# Patient Record
Sex: FEMALE | RURAL
[De-identification: names, ages, dates, MRNs, and addresses within clinical notes are randomized per-mention and may not be internally consistent; named-entity substitution may affect disease eponyms.]

---

## 2021-01-28 ENCOUNTER — HISTORICAL (OUTPATIENT)
Dept: ADMINISTRATIVE | Facility: HOSPITAL | Age: 56
End: 2021-01-28

## 2021-01-28 LAB
ALBUMIN SERPL BCP-MCNC: 4.1 G/DL (ref 3.5–5)
ALP SERPL-CCNC: 110 U/L (ref 46–118)
ALT SERPL W P-5'-P-CCNC: 19 U/L (ref 13–56)
AST SERPL W P-5'-P-CCNC: 12 U/L (ref 15–37)
BILIRUB DIRECT SERPL-MCNC: <0.1 MG/DL (ref 0–0.2)
BILIRUB SERPL-MCNC: 0.2 MG/DL (ref 0–1.2)
PROT SERPL-MCNC: 8.7 G/DL (ref 6.4–8.2)

## 2024-09-23 DIAGNOSIS — R91.1 PULMONARY NODULE: Primary | ICD-10-CM

## 2024-09-23 DIAGNOSIS — J44.9 COPD (CHRONIC OBSTRUCTIVE PULMONARY DISEASE): ICD-10-CM

## 2024-10-07 ENCOUNTER — OFFICE VISIT (OUTPATIENT)
Dept: PULMONOLOGY | Facility: CLINIC | Age: 59
End: 2024-10-07
Payer: COMMERCIAL

## 2024-10-07 VITALS
WEIGHT: 151 LBS | DIASTOLIC BLOOD PRESSURE: 96 MMHG | RESPIRATION RATE: 16 BRPM | BODY MASS INDEX: 25.78 KG/M2 | SYSTOLIC BLOOD PRESSURE: 138 MMHG | HEART RATE: 88 BPM | HEIGHT: 64 IN | OXYGEN SATURATION: 96 %

## 2024-10-07 DIAGNOSIS — R91.8 MULTIPLE PULMONARY NODULES: ICD-10-CM

## 2024-10-07 DIAGNOSIS — J44.9 COPD (CHRONIC OBSTRUCTIVE PULMONARY DISEASE): Primary | ICD-10-CM

## 2024-10-07 DIAGNOSIS — R06.02 SHORTNESS OF BREATH: ICD-10-CM

## 2024-10-07 DIAGNOSIS — R91.1 PULMONARY NODULE: ICD-10-CM

## 2024-10-07 DIAGNOSIS — Z72.0 TOBACCO USE: ICD-10-CM

## 2024-10-07 PROCEDURE — 3080F DIAST BP >= 90 MM HG: CPT | Mod: CPTII,,, | Performed by: INTERNAL MEDICINE

## 2024-10-07 PROCEDURE — 99205 OFFICE O/P NEW HI 60 MIN: CPT | Mod: PBBFAC | Performed by: INTERNAL MEDICINE

## 2024-10-07 PROCEDURE — 3008F BODY MASS INDEX DOCD: CPT | Mod: CPTII,,, | Performed by: INTERNAL MEDICINE

## 2024-10-07 PROCEDURE — 99204 OFFICE O/P NEW MOD 45 MIN: CPT | Mod: S$PBB,,, | Performed by: INTERNAL MEDICINE

## 2024-10-07 PROCEDURE — 3075F SYST BP GE 130 - 139MM HG: CPT | Mod: CPTII,,, | Performed by: INTERNAL MEDICINE

## 2024-10-07 PROCEDURE — 1159F MED LIST DOCD IN RCRD: CPT | Mod: CPTII,,, | Performed by: INTERNAL MEDICINE

## 2024-10-07 PROCEDURE — 99999 PR PBB SHADOW E&M-NEW PATIENT-LVL V: CPT | Mod: PBBFAC,,, | Performed by: INTERNAL MEDICINE

## 2024-10-07 RX ORDER — CALCIUM CARBONATE 200(500)MG
1 TABLET,CHEWABLE ORAL
COMMUNITY

## 2024-10-07 RX ORDER — ROSUVASTATIN CALCIUM 20 MG/1
20 TABLET, COATED ORAL NIGHTLY
COMMUNITY
Start: 2024-09-09

## 2024-10-07 RX ORDER — AMLODIPINE BESYLATE 10 MG/1
10 TABLET ORAL NIGHTLY
COMMUNITY
Start: 2024-09-02

## 2024-10-07 RX ORDER — ASPIRIN 81 MG/1
81 TABLET ORAL DAILY
COMMUNITY

## 2024-10-07 RX ORDER — CYCLOBENZAPRINE HCL 10 MG
10 TABLET ORAL 3 TIMES DAILY PRN
COMMUNITY
Start: 2024-05-01

## 2024-10-07 RX ORDER — ONDANSETRON 4 MG/1
8 TABLET, FILM COATED ORAL 2 TIMES DAILY
COMMUNITY
Start: 2024-10-02

## 2024-10-07 RX ORDER — MELOXICAM 15 MG/1
15 TABLET ORAL DAILY
COMMUNITY
Start: 2024-10-04

## 2024-10-07 RX ORDER — IPRATROPIUM BROMIDE AND ALBUTEROL 20; 100 UG/1; UG/1
1 SPRAY, METERED RESPIRATORY (INHALATION) 4 TIMES DAILY
Qty: 4 G | Refills: 5 | Status: SHIPPED | OUTPATIENT
Start: 2024-10-07

## 2024-10-07 NOTE — PROGRESS NOTES
Subjective:       Patient ID: Michelle Sharp is a 59 y.o. female.    Chief Complaint: Pulmonary Nodules (New patient referred by CORBIN Lutz for pulmonary nodules and COPD. )    Pulmonary Nodules  This is a chronic problem. The current episode started more than 1 month ago. The problem occurs daily. The problem has been unchanged. Pertinent negatives include no abdominal pain, arthralgias, chest pain, chills, congestion, headaches or rash. The symptoms are aggravated by exertion.     Past Medical History:   Diagnosis Date    DDD (degenerative disc disease), lumbar     Emphysema, unspecified     Hypertension     Lung nodule seen on imaging study     Mixed hyperlipidemia      Past Surgical History:   Procedure Laterality Date    breast tumor removal Right     fibrocystic breast    CHOLECYSTECTOMY      COLOSTOMY      placed and removed    HERNIA REPAIR      TONSILLECTOMY      TUBAL LIGATION       No family history on file.  Review of patient's allergies indicates:  No Known Allergies   Social History     Tobacco Use    Smoking status: Every Day     Current packs/day: 0.50     Average packs/day: 0.5 packs/day for 45.8 years (22.9 ttl pk-yrs)     Types: Cigarettes     Start date: 1979    Smokeless tobacco: Never   Substance Use Topics    Alcohol use: Yes     Alcohol/week: 1.0 standard drink of alcohol     Types: 1 Glasses of wine per week     Comment: Occ    Drug use: Yes     Types: Marijuana      Review of Systems   Constitutional:  Negative for chills, activity change and night sweats.   HENT:  Negative for congestion and ear pain.    Eyes:  Negative for redness and itching.   Cardiovascular:  Negative for chest pain and palpitations.   Musculoskeletal:  Negative for arthralgias and back pain.   Skin:  Negative for rash.   Gastrointestinal:  Negative for abdominal pain and abdominal distention.   Neurological:  Negative for dizziness and headaches.   Hematological:  Negative for adenopathy. Does not bruise/bleed  "easily.   Psychiatric/Behavioral:  Negative for confusion. The patient is not nervous/anxious.        Objective:      Physical Exam   Constitutional: She is oriented to person, place, and time. She appears well-developed and well-nourished.   HENT:   Head: Normocephalic.   Nose: Nose normal.   Mouth/Throat: Oropharynx is clear and moist.   Neck: No JVD present. No thyromegaly present.   Cardiovascular: Normal rate, regular rhythm, normal heart sounds and intact distal pulses.   Pulmonary/Chest: Normal expansion, hyperinflation, symmetric chest wall expansion, effort normal and breath sounds normal.   Abdominal: Soft. Bowel sounds are normal.   Musculoskeletal:         General: Normal range of motion.      Cervical back: Normal range of motion and neck supple.   Lymphadenopathy: No supraclavicular adenopathy is present.     She has no cervical adenopathy.   Neurological: She is alert and oriented to person, place, and time. She has normal reflexes.   Skin: Skin is warm and dry.   Psychiatric: She has a normal mood and affect. Her behavior is normal.     Personal Diagnostic Review  none pertinent        10/7/2024     3:59 PM   Pulmonary Function Tests   SpO2 96 %   Height 5' 4" (1.626 m)   Weight 68.5 kg (151 lb)   BMI (Calculated) 25.9         Assessment:       1. Multiple pulmonary nodules    2. Pulmonary nodule    3. COPD (chronic obstructive pulmonary disease)    4. Shortness of breath    5. Tobacco use        Outpatient Encounter Medications as of 10/7/2024   Medication Sig Dispense Refill    amLODIPine (NORVASC) 10 MG tablet Take 10 mg by mouth every evening.      aspirin (ECOTRIN) 81 MG EC tablet Take 81 mg by mouth once daily.      cyclobenzaprine (FLEXERIL) 10 MG tablet Take 10 mg by mouth 3 (three) times daily as needed.      meloxicam (MOBIC) 15 MG tablet Take 15 mg by mouth once daily.      ondansetron (ZOFRAN) 4 MG tablet Take 8 mg by mouth 2 (two) times daily.      rosuvastatin (CRESTOR) 20 MG tablet " Take 20 mg by mouth every evening.      calcium carbonate (TUMS) 200 mg calcium (500 mg) chewable tablet Take 1 tablet by mouth as needed. (Patient not taking: Reported on 10/7/2024)      ipratropium-albuteroL (COMBIVENT RESPIMAT)  mcg/actuation inhaler Inhale 1 puff into the lungs 4 (four) times daily. Rescue 4 g 5     No facility-administered encounter medications on file as of 10/7/2024.     Orders Placed This Encounter   Procedures    Complete PFT with bronchodilator     Standing Status:   Future     Standing Expiration Date:   10/7/2025     Order Specific Question:   Release to patient     Answer:   Immediate       Plan:       Problem List Items Addressed This Visit          Pulmonary    Multiple pulmonary nodules - Primary     Multiple pulmonary nodules very small we will repeat CT in 5 months floor when patient comes back to clinic         Shortness of breath     Short of breath with exertion is getting worse lately will get PFTs she was unremarkable CT scan add Combivent Respimat to her regimen            Other    Tobacco use     Discussed smoking cessation she was working on          Other Visit Diagnoses       Pulmonary nodule        COPD (chronic obstructive pulmonary disease)        Relevant Orders    Complete PFT with bronchodilator

## 2024-10-07 NOTE — ASSESSMENT & PLAN NOTE
Short of breath with exertion is getting worse lately will get PFTs she was unremarkable CT scan add Combivent Respimat to her regimen

## 2024-10-07 NOTE — ASSESSMENT & PLAN NOTE
Multiple pulmonary nodules very small we will repeat CT in 5 months floor when patient comes back to clinic

## 2024-11-20 RX ORDER — ALBUTEROL SULFATE 90 UG/1
INHALANT RESPIRATORY (INHALATION)
COMMUNITY

## 2024-11-20 RX ORDER — VENLAFAXINE HYDROCHLORIDE 37.5 MG/1
1 CAPSULE, EXTENDED RELEASE ORAL DAILY
COMMUNITY

## 2024-11-21 ENCOUNTER — CLINICAL SUPPORT (OUTPATIENT)
Dept: PULMONOLOGY | Facility: HOSPITAL | Age: 59
End: 2024-11-21
Attending: INTERNAL MEDICINE
Payer: MEDICAID

## 2024-11-21 ENCOUNTER — OFFICE VISIT (OUTPATIENT)
Dept: PULMONOLOGY | Facility: CLINIC | Age: 59
End: 2024-11-21
Payer: MEDICAID

## 2024-11-21 VITALS
HEIGHT: 64 IN | BODY MASS INDEX: 26.91 KG/M2 | OXYGEN SATURATION: 95 % | SYSTOLIC BLOOD PRESSURE: 117 MMHG | DIASTOLIC BLOOD PRESSURE: 67 MMHG | HEART RATE: 93 BPM | WEIGHT: 157.63 LBS

## 2024-11-21 DIAGNOSIS — R22.2 LOCALIZED SWELLING, MASS AND LUMP, TRUNK: Primary | ICD-10-CM

## 2024-11-21 DIAGNOSIS — R91.8 MULTIPLE PULMONARY NODULES: ICD-10-CM

## 2024-11-21 DIAGNOSIS — J44.9 COPD (CHRONIC OBSTRUCTIVE PULMONARY DISEASE): ICD-10-CM

## 2024-11-21 DIAGNOSIS — Z72.0 TOBACCO USE: ICD-10-CM

## 2024-11-21 DIAGNOSIS — J44.9 CHRONIC OBSTRUCTIVE PULMONARY DISEASE, UNSPECIFIED COPD TYPE: Primary | ICD-10-CM

## 2024-11-21 DIAGNOSIS — R06.02 SHORTNESS OF BREATH: ICD-10-CM

## 2024-11-21 PROCEDURE — 94726 PLETHYSMOGRAPHY LUNG VOLUMES: CPT

## 2024-11-21 PROCEDURE — 94727 GAS DIL/WSHOT DETER LNG VOL: CPT

## 2024-11-21 PROCEDURE — 99999 PR PBB SHADOW E&M-EST. PATIENT-LVL IV: CPT | Mod: PBBFAC,,, | Performed by: INTERNAL MEDICINE

## 2024-11-21 PROCEDURE — 99214 OFFICE O/P EST MOD 30 MIN: CPT | Mod: S$PBB,25,, | Performed by: INTERNAL MEDICINE

## 2024-11-21 PROCEDURE — 27100098 HC SPACER

## 2024-11-21 PROCEDURE — 94060 EVALUATION OF WHEEZING: CPT

## 2024-11-21 PROCEDURE — 1159F MED LIST DOCD IN RCRD: CPT | Mod: CPTII,,, | Performed by: INTERNAL MEDICINE

## 2024-11-21 PROCEDURE — 99214 OFFICE O/P EST MOD 30 MIN: CPT | Mod: PBBFAC,25 | Performed by: INTERNAL MEDICINE

## 2024-11-21 PROCEDURE — 94729 DIFFUSING CAPACITY: CPT

## 2024-11-21 NOTE — PROGRESS NOTES
Subjective:       Patient ID: Michelle Sharp is a 59 y.o. female.    Chief Complaint: Follow-up (Patient is here for a 1 month follow up. Patient states she had her PFT done. Patient had a question about an annual low-dose screening. Patient has no complaints, states she still get SOB upon exertion. ) and Shortness of Breath    Follow-up  This is a chronic problem. The current episode started more than 1 month ago. The problem has been unchanged. Pertinent negatives include no abdominal pain, arthralgias, chest pain, chills, congestion, headaches or rash.   Shortness of Breath  Pertinent negatives include no abdominal pain, chest pain, ear pain, headaches or rash.     Past Medical History:   Diagnosis Date    DDD (degenerative disc disease), lumbar     Emphysema, unspecified     Hypertension     Lung nodule seen on imaging study     Mixed hyperlipidemia      Past Surgical History:   Procedure Laterality Date    breast tumor removal Right     fibrocystic breast    CHOLECYSTECTOMY      COLOSTOMY      placed and removed    HERNIA REPAIR      TONSILLECTOMY      TUBAL LIGATION       No family history on file.  Review of patient's allergies indicates:  No Known Allergies   Social History     Tobacco Use    Smoking status: Every Day     Current packs/day: 0.50     Average packs/day: 0.5 packs/day for 45.9 years (22.9 ttl pk-yrs)     Types: Cigarettes     Start date: 1979    Smokeless tobacco: Never   Substance Use Topics    Alcohol use: Yes     Alcohol/week: 1.0 standard drink of alcohol     Types: 1 Glasses of wine per week     Comment: Occ    Drug use: Yes     Types: Marijuana      Review of Systems   Constitutional:  Negative for chills, activity change and night sweats.   HENT:  Negative for congestion and ear pain.    Eyes:  Negative for redness and itching.   Respiratory:  Positive for shortness of breath.    Cardiovascular:  Negative for chest pain and palpitations.   Musculoskeletal:  Negative for arthralgias and  "back pain.   Skin:  Negative for rash.   Gastrointestinal:  Negative for abdominal pain and abdominal distention.   Neurological:  Negative for dizziness and headaches.   Hematological:  Negative for adenopathy. Does not bruise/bleed easily.   Psychiatric/Behavioral:  Negative for confusion. The patient is not nervous/anxious.        Objective:      Physical Exam   Constitutional: She is oriented to person, place, and time. She appears well-developed and well-nourished.   HENT:   Head: Normocephalic.   Nose: Nose normal.   Mouth/Throat: Oropharynx is clear and moist.   Neck: No JVD present. No thyromegaly present.   Cardiovascular: Normal rate, regular rhythm, normal heart sounds and intact distal pulses.   Pulmonary/Chest: Normal expansion, hyperinflation, symmetric chest wall expansion, effort normal and breath sounds normal.   Abdominal: Soft. Bowel sounds are normal.   Musculoskeletal:         General: Normal range of motion.      Cervical back: Normal range of motion and neck supple.   Lymphadenopathy: No supraclavicular adenopathy is present.     She has no cervical adenopathy.   Neurological: She is alert and oriented to person, place, and time. She has normal reflexes.   Skin: Skin is warm and dry.   Psychiatric: She has a normal mood and affect. Her behavior is normal.     Personal Diagnostic Review  none pertinent        11/21/2024     1:15 PM 11/21/2024    10:09 AM 10/7/2024     3:59 PM   Pulmonary Function Tests   FVC  3.59 liters    FVC%  114    FEV1  2.41 liters    FEV1%  97    FEF 25-75  1.38    FEF 25-75%  62    TLC (liters)  5.94 liters    TLC%  117    RV  2.35    RV%  117    DLCO (ml/mmHg sec)  6.7 ml/mmHg sec    DLCO%  33    Peak Flow  304 L/min    SpO2 95 %  96 %   Height 5' 4" (1.626 m)  5' 4" (1.626 m)   Weight 71.5 kg (157 lb 9.6 oz)  68.5 kg (151 lb)   BMI (Calculated) 27  25.9         Assessment:       1. Localized swelling, mass and lump, trunk    2. Multiple pulmonary nodules    3. " No apparent complications or complaints regarding anesthesia care at this time/All questions were answered Shortness of breath    4. Tobacco use        Outpatient Encounter Medications as of 11/21/2024   Medication Sig Dispense Refill    amLODIPine (NORVASC) 10 MG tablet Take 10 mg by mouth every evening.      aspirin (ECOTRIN) 81 MG EC tablet Take 81 mg by mouth once daily.      calcium carbonate (TUMS) 200 mg calcium (500 mg) chewable tablet Take 1 tablet by mouth as needed.      cyclobenzaprine (FLEXERIL) 10 MG tablet Take 10 mg by mouth 3 (three) times daily as needed.      ipratropium-albuteroL (COMBIVENT RESPIMAT)  mcg/actuation inhaler Inhale 1 puff into the lungs 4 (four) times daily. Rescue 4 g 5    meloxicam (MOBIC) 15 MG tablet Take 15 mg by mouth once daily.      ondansetron (ZOFRAN) 4 MG tablet Take 8 mg by mouth 2 (two) times daily.      rosuvastatin (CRESTOR) 20 MG tablet Take 20 mg by mouth every evening.      albuterol (PROVENTIL/VENTOLIN HFA) 90 mcg/actuation inhaler INHALE 1 PUFF BY MOUTH EVERY 4 HOURS AS NEEDED SHAKE WELL BEFORE USING (Patient not taking: Reported on 11/21/2024)      venlafaxine (EFFEXOR-XR) 37.5 MG 24 hr capsule Take 1 capsule by mouth once daily. (Patient not taking: Reported on 11/21/2024)       No facility-administered encounter medications on file as of 11/21/2024.     Orders Placed This Encounter   Procedures    CT Chest Without Contrast     Standing Status:   Future     Standing Expiration Date:   11/21/2025     Order Specific Question:   May the Radiologist modify the order per protocol to meet the clinical needs of the patient?     Answer:   Yes     Order Specific Question:   Access port per protocol?     Answer:   No       Plan:       Problem List Items Addressed This Visit          Pulmonary    Multiple pulmonary nodules     CT needs to be done on repeat visit 3 months         Shortness of breath     Pulmonary function tests show mild obstructive ventilatory impairment.  She continues to smoke.  Continue her Combivent Respimat            Other    Tobacco use      Smoking cessation discussed          Other Visit Diagnoses       Localized swelling, mass and lump, trunk    -  Primary    Relevant Orders    CT Chest Without Contrast

## 2024-11-21 NOTE — PROCEDURES
Pulmonary function test  Forced vital capacity 3.50 L or 111% predicted   FEV1 2.42 L 98% predicted   FEV1 ratio 69%   Mild small airways disease   Hyperinflation   Isolated decreased DLCO   No broncho reactivity  Mild restrictive ventilatory impairment with hyperinflation decreased DLCO

## 2024-11-21 NOTE — ASSESSMENT & PLAN NOTE
Pulmonary function tests show mild obstructive ventilatory impairment.  She continues to smoke.  Continue her Combivent Respimat

## 2025-02-17 ENCOUNTER — HOSPITAL ENCOUNTER (OUTPATIENT)
Dept: RADIOLOGY | Facility: HOSPITAL | Age: 60
Discharge: HOME | End: 2025-02-17
Attending: INTERNAL MEDICINE
Payer: COMMERCIAL

## 2025-02-17 DIAGNOSIS — R22.2 LOCALIZED SWELLING, MASS AND LUMP, TRUNK: ICD-10-CM

## 2025-02-17 PROCEDURE — 71250 CT THORAX DX C-: CPT | Mod: TC

## 2025-02-21 ENCOUNTER — OFFICE VISIT (OUTPATIENT)
Dept: PULMONOLOGY | Facility: CLINIC | Age: 60
End: 2025-02-21
Payer: COMMERCIAL

## 2025-02-21 VITALS
OXYGEN SATURATION: 97 % | SYSTOLIC BLOOD PRESSURE: 102 MMHG | HEART RATE: 85 BPM | WEIGHT: 157.63 LBS | RESPIRATION RATE: 16 BRPM | BODY MASS INDEX: 26.91 KG/M2 | HEIGHT: 64 IN | DIASTOLIC BLOOD PRESSURE: 70 MMHG

## 2025-02-21 DIAGNOSIS — R22.2 LOCALIZED SWELLING, MASS AND LUMP, TRUNK: Primary | ICD-10-CM

## 2025-02-21 DIAGNOSIS — R91.8 MULTIPLE PULMONARY NODULES: ICD-10-CM

## 2025-02-21 DIAGNOSIS — Z72.0 TOBACCO USE: ICD-10-CM

## 2025-02-21 DIAGNOSIS — R06.02 SHORTNESS OF BREATH: ICD-10-CM

## 2025-02-21 PROCEDURE — 99999 PR PBB SHADOW E&M-EST. PATIENT-LVL V: CPT | Mod: PBBFAC,,, | Performed by: INTERNAL MEDICINE

## 2025-02-21 PROCEDURE — 99215 OFFICE O/P EST HI 40 MIN: CPT | Mod: PBBFAC | Performed by: INTERNAL MEDICINE

## 2025-02-21 NOTE — PROGRESS NOTES
Subjective:       Patient ID: Michelle Sharp is a 60 y.o. female.    Chief Complaint: Follow-up (3mo follow up/CT. Combivent inhaler is causing a sore throat and dizziness. )    Follow-up  This is a chronic problem. The current episode started more than 1 month ago. The problem has been unchanged. Pertinent negatives include no abdominal pain, arthralgias, chest pain, chills, congestion, headaches or rash. The symptoms are aggravated by exertion.     Past Medical History:   Diagnosis Date    DDD (degenerative disc disease), lumbar     Emphysema, unspecified     Hypertension     Lung nodule seen on imaging study     Mixed hyperlipidemia      Past Surgical History:   Procedure Laterality Date    breast tumor removal Right     fibrocystic breast    CHOLECYSTECTOMY      COLOSTOMY      placed and removed    HERNIA REPAIR      TONSILLECTOMY      TUBAL LIGATION       No family history on file.  Review of patient's allergies indicates:  No Known Allergies   Social History[1]   Review of Systems   Constitutional:  Negative for chills, activity change and night sweats.   HENT:  Negative for congestion and ear pain.    Eyes:  Negative for redness and itching.   Cardiovascular:  Negative for chest pain and palpitations.   Musculoskeletal:  Negative for arthralgias and back pain.   Skin:  Negative for rash.   Gastrointestinal:  Negative for abdominal pain and abdominal distention.   Neurological:  Negative for dizziness and headaches.   Hematological:  Negative for adenopathy. Does not bruise/bleed easily.   Psychiatric/Behavioral:  Negative for confusion. The patient is not nervous/anxious.        Objective:      Physical Exam   Constitutional: She is oriented to person, place, and time. She appears well-developed and well-nourished.   HENT:   Head: Normocephalic.   Nose: Nose normal.   Mouth/Throat: Oropharynx is clear and moist.   Neck: No JVD present. No thyromegaly present.   Cardiovascular: Normal rate, regular rhythm,  "normal heart sounds and intact distal pulses.   Pulmonary/Chest: Normal expansion, hyperinflation, symmetric chest wall expansion, effort normal and breath sounds normal.   Abdominal: Soft. Bowel sounds are normal.   Musculoskeletal:         General: Normal range of motion.      Cervical back: Normal range of motion and neck supple.   Lymphadenopathy: No supraclavicular adenopathy is present.     She has no cervical adenopathy.   Neurological: She is alert and oriented to person, place, and time. She has normal reflexes.   Skin: Skin is warm and dry.   Psychiatric: She has a normal mood and affect. Her behavior is normal.     Personal Diagnostic Review  none pertinent        2/21/2025    10:49 AM 11/21/2024     1:15 PM 11/21/2024    10:09 AM 10/7/2024     3:59 PM   Pulmonary Function Tests   FVC   3.59 liters    FVC%   114    FEV1   2.41 liters    FEV1%   97    FEF 25-75   1.38    FEF 25-75%   62    TLC (liters)   5.94 liters    TLC%   117    RV   2.35    RV%   117    DLCO (ml/mmHg sec)   6.7 ml/mmHg sec    DLCO%   33    Peak Flow   304 L/min    SpO2 97 % 95 %  96 %   Height 5' 4" (1.626 m) 5' 4" (1.626 m)  5' 4" (1.626 m)   Weight 71.5 kg (157 lb 10.1 oz) 71.5 kg (157 lb 9.6 oz)  68.5 kg (151 lb)   BMI (Calculated) 27 27  25.9         Assessment:       1. Localized swelling, mass and lump, trunk    2. Multiple pulmonary nodules    3. Shortness of breath    4. Tobacco use        Encounter Medications[2]  Orders Placed This Encounter   Procedures    CT Chest Without Contrast     Standing Status:   Future     Expected Date:   2/21/2025     Expiration Date:   2/21/2026     May the Radiologist modify the order per protocol to meet the clinical needs of the patient?:   Yes     Access port per protocol?:   No       Plan:       Problem List Items Addressed This Visit          Pulmonary    Multiple pulmonary nodules    Nodes are unchanged repeat in 6 months to nodules that are about the same size         Shortness of breath "    She has moderate restrictive lung disease does need to quit smoking continue on Combivent Respimat and Ventolin for now up-to-date on vaccinations            Other    Tobacco use    Discussed smoking cessation          Other Visit Diagnoses         Localized swelling, mass and lump, trunk    -  Primary    Relevant Orders    CT Chest Without Contrast                           [1]   Social History  Tobacco Use    Smoking status: Every Day     Current packs/day: 0.50     Average packs/day: 0.5 packs/day for 46.1 years (23.1 ttl pk-yrs)     Types: Cigarettes     Start date: 1979    Smokeless tobacco: Never   Substance Use Topics    Alcohol use: Yes     Alcohol/week: 1.0 standard drink of alcohol     Types: 1 Glasses of wine per week     Comment: Occ    Drug use: Yes     Types: Marijuana   [2]   Outpatient Encounter Medications as of 2/21/2025   Medication Sig Dispense Refill    albuterol (PROVENTIL/VENTOLIN HFA) 90 mcg/actuation inhaler       amLODIPine (NORVASC) 10 MG tablet Take 10 mg by mouth every evening.      aspirin (ECOTRIN) 81 MG EC tablet Take 81 mg by mouth once daily.      calcium carbonate (TUMS) 200 mg calcium (500 mg) chewable tablet Take 1 tablet by mouth as needed.      cyclobenzaprine (FLEXERIL) 10 MG tablet Take 10 mg by mouth 3 (three) times daily as needed.      ipratropium-albuteroL (COMBIVENT RESPIMAT)  mcg/actuation inhaler Inhale 1 puff into the lungs 4 (four) times daily. Rescue 4 g 5    meloxicam (MOBIC) 15 MG tablet Take 15 mg by mouth once daily.      ondansetron (ZOFRAN) 4 MG tablet Take 8 mg by mouth 2 (two) times daily.      rosuvastatin (CRESTOR) 20 MG tablet Take 20 mg by mouth every evening.      venlafaxine (EFFEXOR-XR) 37.5 MG 24 hr capsule Take 1 capsule by mouth once daily.       No facility-administered encounter medications on file as of 2/21/2025.

## 2025-02-21 NOTE — ASSESSMENT & PLAN NOTE
She has moderate restrictive lung disease does need to quit smoking continue on Combivent Respimat and Ventolin for now up-to-date on vaccinations

## 2025-04-17 ENCOUNTER — OFFICE VISIT (OUTPATIENT)
Dept: PULMONOLOGY | Facility: CLINIC | Age: 60
End: 2025-04-17
Payer: MEDICAID

## 2025-04-17 VITALS
OXYGEN SATURATION: 96 % | DIASTOLIC BLOOD PRESSURE: 68 MMHG | RESPIRATION RATE: 18 BRPM | HEART RATE: 82 BPM | BODY MASS INDEX: 24.75 KG/M2 | HEIGHT: 64 IN | SYSTOLIC BLOOD PRESSURE: 133 MMHG | WEIGHT: 145 LBS

## 2025-04-17 DIAGNOSIS — I27.20 PULMONARY HTN: ICD-10-CM

## 2025-04-17 DIAGNOSIS — Z72.0 TOBACCO USE: Primary | ICD-10-CM

## 2025-04-17 DIAGNOSIS — R91.8 MULTIPLE PULMONARY NODULES: ICD-10-CM

## 2025-04-17 DIAGNOSIS — R06.02 SHORTNESS OF BREATH: ICD-10-CM

## 2025-04-17 PROCEDURE — 99999 PR PBB SHADOW E&M-EST. PATIENT-LVL IV: CPT | Mod: PBBFAC,,, | Performed by: INTERNAL MEDICINE

## 2025-04-17 PROCEDURE — 99214 OFFICE O/P EST MOD 30 MIN: CPT | Mod: PBBFAC | Performed by: INTERNAL MEDICINE

## 2025-04-17 RX ORDER — IPRATROPIUM BROMIDE AND ALBUTEROL 20; 100 UG/1; UG/1
1 SPRAY, METERED RESPIRATORY (INHALATION) 4 TIMES DAILY
Qty: 4 G | Refills: 5 | Status: SHIPPED | OUTPATIENT
Start: 2025-04-17

## 2025-04-17 NOTE — PROGRESS NOTES
Subjective:       Patient ID: Michelle Sharp is a 60 y.o. female.    Chief Complaint: Follow-up    History of Present Illness    CHIEF COMPLAINT:  Ms. Sharp presents today for follow up of severe pulmonary hypertension.    PULMONARY HYPERTENSION:  She was diagnosed with severe pulmonary hypertension following cardiac catheterization on April 10th. She experiences dizziness and sensation of blood rushing to her head when lying down at night, requiring her to sit up temporarily for symptom relief.    RESPIRATORY MEDICATIONS:  She reports being out of Combivent. She has not required use of her albuterol inhaler since the cardiac catheterization.    SMOKING STATUS:  She has reduced smoking from 1.5 packs to 0.5 packs per day.    DIAGNOSTIC STUDIES:  Pulmonary function testing was performed in November 2024. CT completed in February with recommendation for repeat imaging in February 2026.      ROS:  General: -fever, -chills, -fatigue, -weight gain, -weight loss  Eyes: -vision changes, -redness, -discharge  ENT: -ear pain, -nasal congestion, -sore throat  Cardiovascular: -chest pain, -palpitations, -lower extremity edema  Respiratory: -cough, +shortness of breath  Gastrointestinal: -abdominal pain, -nausea, -vomiting, -diarrhea, -constipation, -blood in stool  Genitourinary: -dysuria, -hematuria, -frequency  Musculoskeletal: -joint pain, -muscle pain  Skin: -rash, -lesion  Neurological: -headache, +dizziness, -numbness, -tingling  Psychiatric: -anxiety, -depression, -sleep difficulty          Objective:      Physical Exam   Constitutional: She is oriented to person, place, and time. She appears well-developed and well-nourished.   HENT:   Head: Normocephalic.   Nose: Nose normal.   Mouth/Throat: Oropharynx is clear and moist.   Neck: No JVD present. No thyromegaly present.   Cardiovascular: Normal rate, regular rhythm, normal heart sounds and intact distal pulses.   Pulmonary/Chest: Normal expansion, hyperinflation,  "symmetric chest wall expansion, effort normal and breath sounds normal.   Abdominal: Soft. Bowel sounds are normal.   Musculoskeletal:         General: Normal range of motion.      Cervical back: Normal range of motion and neck supple.   Lymphadenopathy: No supraclavicular adenopathy is present.     She has no cervical adenopathy.   Neurological: She is alert and oriented to person, place, and time. She has normal reflexes.   Skin: Skin is warm and dry.   Psychiatric: She has a normal mood and affect. Her behavior is normal.     Personal Diagnostic Review  none pertinent        4/17/2025     3:44 PM 2/21/2025    10:49 AM 11/21/2024     1:15 PM 11/21/2024    10:09 AM 10/7/2024     3:59 PM   Pulmonary Function Tests   FVC    3.59 liters    FVC%    114    FEV1    2.41 liters    FEV1%    97    FEF 25-75    1.38    FEF 25-75%    62    TLC (liters)    5.94 liters    TLC%    117    RV    2.35    RV%    117    DLCO (ml/mmHg sec)    6.7 ml/mmHg sec    DLCO%    33    Peak Flow    304 L/min    SpO2 96 % 97 % 95 %  96 %   Height 5' 4" (1.626 m) 5' 4" (1.626 m) 5' 4" (1.626 m)  5' 4" (1.626 m)   Weight 65.8 kg (145 lb) 71.5 kg (157 lb 10.1 oz) 71.5 kg (157 lb 9.6 oz)  68.5 kg (151 lb)   BMI (Calculated) 24.9 27 27  25.9           Current Medications[1]   Assessment:       1. Tobacco use    2. Shortness of breath    3. Multiple pulmonary nodules    4. Pulmonary HTN        Encounter Medications[2]  No orders of the defined types were placed in this encounter.      Plan:       Problem List Items Addressed This Visit          Pulmonary    Multiple pulmonary nodules    Shortness of breath       Cardiac/Vascular    Pulmonary HTN       Other    Tobacco use - Primary         Assessment & Plan    I27.20 Pulmonary HTN  Z72.0 Tobacco use  R06.02 Shortness of breath  R91.8 Multiple pulmonary nodules    IMPRESSION:  - Severe pulmonary HTN, confirmed by Dr. Christensen.  - Recent heart catheterization performed on April 10th, results pending.  - " Echocardiogram results needed for comprehensive evaluation.  - CT done in February, follow-up recommended in 1 year.  - Patient reports symptoms of blood rushing to head when lying down, possibly related to carotid artery issues.    PULMONARY HTN:  - Referred to Dr. Wheat for specialized pulmonary HTN management on May 12th.    TOBACCO USE:  - Ms. Sharp to continue efforts to reduce smoking, aiming for complete cessation.    SHORTNESS OF BREATH:  - Continued Combivent respirator.    MULTIPLE PULMONARY NODULES:  - Ordered follow-up CT for February 26th of next year.    LIFESTYLE CHANGES:  - Ms. Sharp to continue efforts to reduce smoking, aiming for complete cessation.  Asked her to bring copy of the echo report as well as the right and left heart catheterization            This note was generated with the assistance of ambient listening technology. Verbal consent was obtained by the patient and accompanying visitor(s) for the recording of patient appointment to facilitate this note. I attest to having reviewed and edited the generated note for accuracy, though some syntax or spelling errors may persist. Please contact the author of this note for any clarification.                  [1]   Current Outpatient Medications:     albuterol (PROVENTIL/VENTOLIN HFA) 90 mcg/actuation inhaler, , Disp: , Rfl:     amLODIPine (NORVASC) 10 MG tablet, Take 10 mg by mouth every evening., Disp: , Rfl:     aspirin (ECOTRIN) 81 MG EC tablet, Take 81 mg by mouth once daily., Disp: , Rfl:     calcium carbonate (TUMS) 200 mg calcium (500 mg) chewable tablet, Take 1 tablet by mouth as needed., Disp: , Rfl:     cyclobenzaprine (FLEXERIL) 10 MG tablet, Take 10 mg by mouth 3 (three) times daily as needed., Disp: , Rfl:     meloxicam (MOBIC) 15 MG tablet, Take 15 mg by mouth once daily., Disp: , Rfl:     ondansetron (ZOFRAN) 4 MG tablet, Take 8 mg by mouth 2 (two) times daily., Disp: , Rfl:     rosuvastatin (CRESTOR) 20 MG tablet, Take 20 mg  by mouth every evening., Disp: , Rfl:     venlafaxine (EFFEXOR-XR) 37.5 MG 24 hr capsule, Take 1 capsule by mouth once daily., Disp: , Rfl:     ipratropium-albuteroL (COMBIVENT RESPIMAT)  mcg/actuation inhaler, Inhale 1 puff into the lungs 4 (four) times daily. Rescue, Disp: 4 g, Rfl: 5  [2]   Outpatient Encounter Medications as of 4/17/2025   Medication Sig Dispense Refill    albuterol (PROVENTIL/VENTOLIN HFA) 90 mcg/actuation inhaler       amLODIPine (NORVASC) 10 MG tablet Take 10 mg by mouth every evening.      aspirin (ECOTRIN) 81 MG EC tablet Take 81 mg by mouth once daily.      calcium carbonate (TUMS) 200 mg calcium (500 mg) chewable tablet Take 1 tablet by mouth as needed.      cyclobenzaprine (FLEXERIL) 10 MG tablet Take 10 mg by mouth 3 (three) times daily as needed.      meloxicam (MOBIC) 15 MG tablet Take 15 mg by mouth once daily.      ondansetron (ZOFRAN) 4 MG tablet Take 8 mg by mouth 2 (two) times daily.      rosuvastatin (CRESTOR) 20 MG tablet Take 20 mg by mouth every evening.      venlafaxine (EFFEXOR-XR) 37.5 MG 24 hr capsule Take 1 capsule by mouth once daily.      [DISCONTINUED] ipratropium-albuteroL (COMBIVENT RESPIMAT)  mcg/actuation inhaler Inhale 1 puff into the lungs 4 (four) times daily. Rescue 4 g 5    ipratropium-albuteroL (COMBIVENT RESPIMAT)  mcg/actuation inhaler Inhale 1 puff into the lungs 4 (four) times daily. Rescue 4 g 5     No facility-administered encounter medications on file as of 4/17/2025.

## 2025-05-12 ENCOUNTER — OFFICE VISIT (OUTPATIENT)
Dept: PULMONOLOGY | Facility: CLINIC | Age: 60
End: 2025-05-12
Payer: MEDICAID

## 2025-05-12 VITALS
RESPIRATION RATE: 16 BRPM | HEART RATE: 75 BPM | HEIGHT: 64 IN | DIASTOLIC BLOOD PRESSURE: 72 MMHG | WEIGHT: 143.31 LBS | BODY MASS INDEX: 24.46 KG/M2 | OXYGEN SATURATION: 93 % | SYSTOLIC BLOOD PRESSURE: 108 MMHG

## 2025-05-12 DIAGNOSIS — I27.20 PULMONARY HTN: Primary | ICD-10-CM

## 2025-05-12 PROCEDURE — 3078F DIAST BP <80 MM HG: CPT | Mod: CPTII,,, | Performed by: STUDENT IN AN ORGANIZED HEALTH CARE EDUCATION/TRAINING PROGRAM

## 2025-05-12 PROCEDURE — 3074F SYST BP LT 130 MM HG: CPT | Mod: CPTII,,, | Performed by: STUDENT IN AN ORGANIZED HEALTH CARE EDUCATION/TRAINING PROGRAM

## 2025-05-12 PROCEDURE — 3008F BODY MASS INDEX DOCD: CPT | Mod: CPTII,,, | Performed by: STUDENT IN AN ORGANIZED HEALTH CARE EDUCATION/TRAINING PROGRAM

## 2025-05-12 PROCEDURE — 99214 OFFICE O/P EST MOD 30 MIN: CPT | Mod: S$PBB,,, | Performed by: STUDENT IN AN ORGANIZED HEALTH CARE EDUCATION/TRAINING PROGRAM

## 2025-05-12 PROCEDURE — 1160F RVW MEDS BY RX/DR IN RCRD: CPT | Mod: CPTII,,, | Performed by: STUDENT IN AN ORGANIZED HEALTH CARE EDUCATION/TRAINING PROGRAM

## 2025-05-12 PROCEDURE — 1159F MED LIST DOCD IN RCRD: CPT | Mod: CPTII,,, | Performed by: STUDENT IN AN ORGANIZED HEALTH CARE EDUCATION/TRAINING PROGRAM

## 2025-05-12 PROCEDURE — 99999 PR PBB SHADOW E&M-EST. PATIENT-LVL IV: CPT | Mod: PBBFAC,,, | Performed by: STUDENT IN AN ORGANIZED HEALTH CARE EDUCATION/TRAINING PROGRAM

## 2025-05-12 PROCEDURE — 99214 OFFICE O/P EST MOD 30 MIN: CPT | Mod: PBBFAC | Performed by: STUDENT IN AN ORGANIZED HEALTH CARE EDUCATION/TRAINING PROGRAM

## 2025-05-12 RX ORDER — IPRATROPIUM BROMIDE AND ALBUTEROL 20; 100 UG/1; UG/1
1 SPRAY, METERED RESPIRATORY (INHALATION) 4 TIMES DAILY
Qty: 4 G | Refills: 11 | Status: SHIPPED | OUTPATIENT
Start: 2025-05-12

## 2025-05-12 NOTE — PROGRESS NOTES
[facility]  Pulmonology  NEW VISIT     Patient Name:  Michelle Sharp  Primary Care Provider: Génesis Villanueva NP  Date of Service: 5/12/2025       Chief Complaint: shortness of breath    SUBJECTIVE   HPI:  Michelle Sharp is a 60 y.o. female with centrilobular emphysema and new diagnosis of pre-capillary pulmonary hypertension who presents today upon referral with complaints of shortness of breath.     Ms. Sharp presents for evaluation and management of pulmonary hypertension, as diagnosed by a recent heart catheterization. She reports chest pain, which prompted her to see her primary care physician, who then referred her to Dr. Christensen who performed the RHC. She describes episodes of chest pain occurring during activities such as sweeping, mowing, and fishing. She recalls a specific incident while fishing where she had chest pain after catching two large catfish and casting a third line, which alarmed her as she was alone and had not informed anyone of her location. She reports shortness of breath, becoming dyspneic when walking from her neighbor's house back to her own. She also has dizziness, recalling an incident where she felt lightheaded after bending down to look under her trailer. She mentions feeling dizzy when lying down at night, describing it as a sensation of blood rushing to her head, which persists regardless of her position. She has noticed a significant decline in her exercise tolerance. In January, she had severe dyspnea after walking across a parking lot at [facility], where her daughter had emergency appendix surgery. This event made her realize the severity of her condition.   She reports smoking one pack of cigarettes per day, reduced from two packs previously. She often lights a cigarette, takes only a few puffs, and then extinguishes it.    SOCIAL HISTORY:  Smoking: Currently smokes 1 pack per day, down from 2 packs per day. Attempting to quit.             Past Medical  "History:   Diagnosis Date    DDD (degenerative disc disease), lumbar     Emphysema, unspecified     Hypertension     Lung nodule seen on imaging study     Mixed hyperlipidemia        Past Surgical History:   Procedure Laterality Date    breast tumor removal Right     fibrocystic breast    CHOLECYSTECTOMY      COLOSTOMY      placed and removed    HERNIA REPAIR      RIGHT HEART CATHETERIZATION  04/10/2025    TONSILLECTOMY      TUBAL LIGATION         No family history on file.     Social History[1]    Social History     Social History Narrative    Not on file       Review of patient's allergies indicates:  No Known Allergies     Medications: Medications reviewed to include over the counter medications.    Review of Systems: A focused ROS was completed and found to be negative except for that mentioned above.      OBJECTIVE   PHYSICAL EXAM:  Vitals:    05/12/25 1540   BP: 108/72   BP Location: Left arm   Patient Position: Sitting   Pulse: 75   Resp: 16   SpO2: (!) 93%   Weight: 65 kg (143 lb 4.8 oz)   Height: 5' 4" (1.626 m)        GENERAL: NAD  HEENT: normocephalic, non-icteric conjunctivae, moist oral mucosa  RESPIRATORY: clear to auscultation, no wheezing, rales or rhonchi  CARDIOVASCULAR: regular rate and rhythm, grade III/VI systolic murmur present   MUSCULOSKELETAL: No clubbing or cyanosis; mild non pititing pedal edema  NEUROLOGIC: AO ×3, no gross deficits    LABS:  Lab studies reviewed and notable for HFP 01/2021 unremarkable    IMAGING:  Imaging reviewed and notable for CT chest 11/2024 with upper lobe predominant acinar and paraseptal emphysema, right middle lobe nodule versus intrapulmonary lymph node, dilated pulmonary artery, no pleural effusion, small pericardial effusion present    [facility] RHC 04/2025: RAP 22, PA 94/44/59, PCWP 13, CO/CI 3.29/1.91, PVR 14 (Td), LVEDP 15    LUNG FUNCTION TESTING:     SPIROMETRY/PFT:  11/2024 Pre Post   FVC 3.59/114% 3.50/111%   FEV1 2.41/97% 2.43/98%   FEV1/FVC 67 69   TLC " 5.94/117%    FRC  4.01/140%    RV 2.35/117%    DLCO 6.70/33%    My interpretation: mild obstruction by GOLD standards, hyperinflation and gas trapping present, mod-severe diffusion deficit    ASSESSMENT & PLAN     IMPRESSION:  Diagnosed pulmonary HTN based on recent heart catheterization results showing significant high pressures in lungs, correlating with reported symptoms during activities.  Determined need to assess oxygen levels before initiating medication due to risk of exacerbating low oxygen with treatment.  Aim to slow disease progression with appropriate treatment, emphasizing goal to lengthen time between disease progression stages.  Acknowledged efforts in smoking reduction, halving consumption from 2 packs to 1 pack daily.  Will update Dr. Fitch about the discussion and treatment plan.    PULMONARY HYPERTENSION:  - Explained pulmonary HTN as hypertension in the lungs causing stiffness in lung blood vessels, clarifying distinction between pulmonary HTN (affecting lung arteries) and coronary artery blockage. Described it as a silent disease that progresses before becoming symptomatic.  - Ordered walking test to check oxygen levels during activity.  - Ordered overnight oxygen level test to be conducted at home.    CHRONIC OBSTRUCTIVE PULMONARY DISEASE:  - Continued Combivent.    NICOTINE DEPENDENCE:  - Ms. Sharp to continue efforts, suggesting portioning out cigarettes to 19 per pack instead of 20.    GENERAL MANAGEMENT:  - Ordered lab work to be done today.           1. Pulmonary HTN  Assessment & Plan:  59 yo F presents to establish for new diagnosis of pre-capillary pulmonary hypertension which is severe with a depressed CI. Imaging and breathing tests reviewed, degree of hypoxia appears out of proportion to emphysematous lung disease concerning for overlapping pulmonary vascular disease contributing to PH. No history of VTE. Will plan on assessing for hypoxia for feasibility of vasodilatory therapy  and avoid worsening of mismatch. Overall, I do favor PAH directed therapy for this patient given hemodynamics and out of proportion PH  - obtain CBC, CMP and NTproBNP  - obtain 6MWT and overnight oximetry  - pending update of labs, will consider 2 drug regimen to include PDE5i      Orders:  -     NT-Pro Natriuretic Peptide; Future; Expected date: 05/12/2025  -     CBC Auto Differential; Future; Expected date: 05/12/2025  -     Comprehensive Metabolic Panel; Future; Expected date: 05/12/2025  -     Stress test, pulmonary; Future  -     PULSE OXIMETRY OVERNIGHT; Future    Other orders  -     ipratropium-albuteroL (COMBIVENT RESPIMAT)  mcg/actuation inhaler; Inhale 1 puff into the lungs 4 (four) times daily. Rescue  Dispense: 4 g; Refill: 11           This note was generated with the assistance of ambient listening technology. Verbal consent was obtained by the patient and accompanying visitor(s) for the recording of patient appointment to facilitate this note. I attest to having reviewed and edited the generated note for accuracy, though some syntax or spelling errors may persist. Please contact the author of this note for any clarification.         Follow up in about 4 weeks (around 6/9/2025) for Routine follow up.    Case was discussed with patient; all questions were answered to patient's satisfaction and patient verbalized understanding.     Yanely Wheat MD  Pulmonary Medicine  [facility]  Phone: [phone]          [1]   Social History  Socioeconomic History    Marital status:    Tobacco Use    Smoking status: Every Day     Current packs/day: 0.50     Average packs/day: 0.5 packs/day for 46.4 years (23.2 ttl pk-yrs)     Types: Cigarettes     Start date: 1979    Smokeless tobacco: Never   Substance and Sexual Activity    Alcohol use: Yes     Alcohol/week: 1.0 standard drink of alcohol     Types: 1 Glasses of wine per week     Comment: Occ    Drug use: Yes     Types: Marijuana    Sexual  activity: Not Currently

## 2025-05-15 NOTE — ASSESSMENT & PLAN NOTE
61 yo F presents to establish for new diagnosis of pre-capillary pulmonary hypertension which is severe with a depressed CI. Imaging and breathing tests reviewed, degree of hypoxia appears out of proportion to emphysematous lung disease concerning for overlapping pulmonary vascular disease contributing to PH. No history of VTE. Will plan on assessing for hypoxia for feasibility of vasodilatory therapy and avoid worsening of mismatch. Overall, I do favor PAH directed therapy for this patient given hemodynamics and out of proportion PH  - obtain CBC, CMP and NTproBNP  - obtain 6MWT and overnight oximetry  - pending update of labs, will consider 2 drug regimen to include PDE5i

## 2025-05-16 DIAGNOSIS — I27.20 PULMONARY HTN: Primary | ICD-10-CM

## 2025-05-16 RX ORDER — FUROSEMIDE 40 MG/1
40 TABLET ORAL DAILY
Qty: 30 TABLET | Refills: 11 | Status: SHIPPED | OUTPATIENT
Start: 2025-05-16 | End: 2026-05-16

## 2025-05-21 ENCOUNTER — TELEPHONE (OUTPATIENT)
Dept: PULMONOLOGY | Facility: CLINIC | Age: 60
End: 2025-05-21
Payer: MEDICAID

## 2025-05-21 NOTE — TELEPHONE ENCOUNTER
Spoke to patient regarding lasix pill, voiced to me she was out of town when we sent a VM but went to pick it up today. Did question if this was suppose to help with the fluid she is experiencing and I stated yes. Patient acknowledged

## 2025-05-21 NOTE — TELEPHONE ENCOUNTER
Copied from CRM #[id number]. Topic: General Inquiry - Patient Advice  >> May 21, 2025 11:05 AM Rita wrote:  Who Called: Michelle Sharp      Who Left Message for Patient:  Does the patient know what this is regarding?:yes      Preferred Method of Contact: Phone Call  Patient's Preferred Phone Number on File: [phone]   Best Call Back Number, if different:  Additional Information: patient would like to speak with the nurse about her new medication

## 2025-06-03 ENCOUNTER — CLINICAL SUPPORT (OUTPATIENT)
Dept: PULMONOLOGY | Facility: HOSPITAL | Age: 60
End: 2025-06-03
Attending: STUDENT IN AN ORGANIZED HEALTH CARE EDUCATION/TRAINING PROGRAM
Payer: MEDICAID

## 2025-06-03 ENCOUNTER — TELEPHONE (OUTPATIENT)
Dept: PULMONOLOGY | Facility: CLINIC | Age: 60
End: 2025-06-03
Payer: MEDICAID

## 2025-06-03 VITALS — HEIGHT: 64 IN | WEIGHT: 143 LBS | BODY MASS INDEX: 24.41 KG/M2

## 2025-06-03 DIAGNOSIS — I27.20 PULMONARY HTN: ICD-10-CM

## 2025-06-03 PROCEDURE — 94618 PULMONARY STRESS TESTING: CPT

## 2025-06-04 PROCEDURE — 94618 PULMONARY STRESS TESTING: CPT | Mod: 26,,, | Performed by: STUDENT IN AN ORGANIZED HEALTH CARE EDUCATION/TRAINING PROGRAM

## 2025-06-16 ENCOUNTER — OFFICE VISIT (OUTPATIENT)
Dept: PULMONOLOGY | Facility: CLINIC | Age: 60
End: 2025-06-16
Payer: MEDICAID

## 2025-06-16 ENCOUNTER — TELEPHONE (OUTPATIENT)
Dept: PULMONOLOGY | Facility: CLINIC | Age: 60
End: 2025-06-16
Payer: MEDICAID

## 2025-06-16 VITALS
OXYGEN SATURATION: 96 % | HEART RATE: 72 BPM | HEIGHT: 64 IN | SYSTOLIC BLOOD PRESSURE: 120 MMHG | WEIGHT: 141.13 LBS | BODY MASS INDEX: 24.1 KG/M2 | DIASTOLIC BLOOD PRESSURE: 78 MMHG | RESPIRATION RATE: 16 BRPM

## 2025-06-16 DIAGNOSIS — I27.21 PULMONARY ARTERIAL HYPERTENSION: ICD-10-CM

## 2025-06-16 DIAGNOSIS — I27.20 PULMONARY HTN: ICD-10-CM

## 2025-06-16 PROCEDURE — 3008F BODY MASS INDEX DOCD: CPT | Mod: CPTII,,, | Performed by: STUDENT IN AN ORGANIZED HEALTH CARE EDUCATION/TRAINING PROGRAM

## 2025-06-16 PROCEDURE — 99999 PR PBB SHADOW E&M-EST. PATIENT-LVL IV: CPT | Mod: PBBFAC,,, | Performed by: STUDENT IN AN ORGANIZED HEALTH CARE EDUCATION/TRAINING PROGRAM

## 2025-06-16 PROCEDURE — 1160F RVW MEDS BY RX/DR IN RCRD: CPT | Mod: CPTII,,, | Performed by: STUDENT IN AN ORGANIZED HEALTH CARE EDUCATION/TRAINING PROGRAM

## 2025-06-16 PROCEDURE — 3074F SYST BP LT 130 MM HG: CPT | Mod: CPTII,,, | Performed by: STUDENT IN AN ORGANIZED HEALTH CARE EDUCATION/TRAINING PROGRAM

## 2025-06-16 PROCEDURE — 3078F DIAST BP <80 MM HG: CPT | Mod: CPTII,,, | Performed by: STUDENT IN AN ORGANIZED HEALTH CARE EDUCATION/TRAINING PROGRAM

## 2025-06-16 PROCEDURE — 99214 OFFICE O/P EST MOD 30 MIN: CPT | Mod: PBBFAC | Performed by: STUDENT IN AN ORGANIZED HEALTH CARE EDUCATION/TRAINING PROGRAM

## 2025-06-16 PROCEDURE — 99214 OFFICE O/P EST MOD 30 MIN: CPT | Mod: S$PBB,,, | Performed by: STUDENT IN AN ORGANIZED HEALTH CARE EDUCATION/TRAINING PROGRAM

## 2025-06-16 PROCEDURE — 1159F MED LIST DOCD IN RCRD: CPT | Mod: CPTII,,, | Performed by: STUDENT IN AN ORGANIZED HEALTH CARE EDUCATION/TRAINING PROGRAM

## 2025-06-16 RX ORDER — SILDENAFIL CITRATE 20 MG/1
20 TABLET ORAL 3 TIMES DAILY
Qty: 90 TABLET | Refills: 11 | Status: SHIPPED | OUTPATIENT
Start: 2025-06-16 | End: 2025-06-16

## 2025-06-16 RX ORDER — SILDENAFIL CITRATE 20 MG/1
20 TABLET ORAL 3 TIMES DAILY
Qty: 90 TABLET | Refills: 11 | Status: ACTIVE | OUTPATIENT
Start: 2025-06-16 | End: 2026-06-16

## 2025-06-16 NOTE — PROGRESS NOTES
[facility]  Pulmonology  NEW VISIT     Patient Name:  Michelle Sharp  Primary Care Provider: Génesis Villanueva NP  Date of Service: 6/16/2025       Chief Complaint: shortness of breath    SUBJECTIVE   HPI:  Michelle Sharp is a 60 y.o. female with centrilobular emphysema and new diagnosis of pre-capillary pulmonary hypertension who presents today upon referral with complaints of shortness of breath.     Ms. Sharp presents for follow-up of pulmonary hypertension and to discuss new medication regimen. She notes persistent leg heaviness when ascending stairs, though less severe than before starting medication. During a recent trip to [place], she had significant leg heaviness after climbing 12 steps. She has been taking a diuretic and reports frequent urination, about 6-7 times over several hours after taking the medication. Ms. Sharp describes dizziness when lying down at night, feeling a sensation of blood rushing to her head. This occurs in both supine and lateral positions, sometimes requiring her to sit up momentarily before reclining. She finds relief by placing her face down on the pillow.  She has a history of menopausal hot flashes, which began at age 41.          Initial HPI  Ms. Sharp presents for evaluation and management of pulmonary hypertension, as diagnosed by a recent heart catheterization. She reports chest pain, which prompted her to see her primary care physician, who then referred her to Dr. Christensen who performed the RHC. She describes episodes of chest pain occurring during activities such as sweeping, mowing, and fishing. She recalls a specific incident while fishing where she had chest pain after catching two large catfish and casting a third line, which alarmed her as she was alone and had not informed anyone of her location. She reports shortness of breath, becoming dyspneic when walking from her neighbor's house back to her own. She also has dizziness, recalling an incident  "where she felt lightheaded after bending down to look under her trailer. She mentions feeling dizzy when lying down at night, describing it as a sensation of blood rushing to her head, which persists regardless of her position. She has noticed a significant decline in her exercise tolerance. In January, she had severe dyspnea after walking across a parking lot at [facility], where her daughter had emergency appendix surgery. This event made her realize the severity of her condition.   She reports smoking one pack of cigarettes per day, reduced from two packs previously. She often lights a cigarette, takes only a few puffs, and then extinguishes it.    SOCIAL HISTORY:  Smoking: Currently smokes 1 pack per day, down from 2 packs per day. Attempting to quit.             Past Medical History:   Diagnosis Date    DDD (degenerative disc disease), lumbar     Emphysema, unspecified     Hypertension     Lung nodule seen on imaging study     Mixed hyperlipidemia        Past Surgical History:   Procedure Laterality Date    breast tumor removal Right     fibrocystic breast    CHOLECYSTECTOMY      COLOSTOMY      placed and removed    HERNIA REPAIR      RIGHT HEART CATHETERIZATION  04/10/2025    TONSILLECTOMY      TUBAL LIGATION         No family history on file.     Social History[1]    Social History     Social History Narrative    Not on file       Review of patient's allergies indicates:  No Known Allergies     Medications: Medications reviewed to include over the counter medications.    Review of Systems: A focused ROS was completed and found to be negative except for that mentioned above.      OBJECTIVE   PHYSICAL EXAM:  Vitals:    06/16/25 1447   BP: 120/78   BP Location: Left arm   Patient Position: Sitting   Pulse: 72   Resp: 16   SpO2: 96%   Weight: 64 kg (141 lb 1.5 oz)   Height: 5' 4" (1.626 m)        GENERAL: NAD  HEENT: normocephalic, non-icteric conjunctivae, moist oral mucosa  RESPIRATORY: clear to " auscultation, no wheezing, rales or rhonchi  CARDIOVASCULAR: regular rate and rhythm, grade III/VI systolic murmur present   MUSCULOSKELETAL: No clubbing or cyanosis; mild non pititing pedal edema  NEUROLOGIC: AO ×3, no gross deficits    LABS:  Lab studies reviewed and notable for H/H 15.6/49.1, SEos 30, CO2 21, SCr 1.03, T bili 0.5, AST/ALT 27/18 (05/2025)   Latest Reference Range & Units 05/12/25 16:36   NT-proBNP 1 - 125 pg/mL 5,242 (H)     IMAGING:  Imaging reviewed and notable for CT chest 11/2024 with upper lobe predominant acinar and paraseptal emphysema, right middle lobe nodule versus intrapulmonary lymph node, dilated pulmonary artery, no pleural effusion, small pericardial effusion present    [facility] RHC 04/2025: RAP 22, PA 94/44/59, PCWP 13, CO/CI 3.29/1.91, PVR 14 (Td), LVEDP 15    LUNG FUNCTION TESTING:     SPIROMETRY/PFT:  11/2024 Pre Post   FVC 3.59/114% 3.50/111%   FEV1 2.41/97% 2.43/98%   FEV1/FVC 67 69   TLC 5.94/117%    FRC  4.01/140%    RV 2.35/117%    DLCO 6.70/33%    My interpretation: mild obstruction by GOLD standards, hyperinflation and gas trapping present, mod-severe diffusion deficit    6MWD:  Date Distance (ft) Resting SpO2; Abdullahi SpO2 O2 Required   06/2025 1274 95%, RA, 92% None           ASSESSMENT & PLAN     IMPRESSION:  Walking test results with no significant oxygen drop.  Lab work showed normal renal function but significant fluid retention.  Considered menopausal status and surgical history (tubal ligation) in medication selection due to potential pregnancy-related side effects.  Dizziness when lying down may be vertigo, or related to carotid artery blockage.    PRIMARY PULMONARY HYPERTENSION:  - Explained the difference between hypotension and normal low BP in pulmonary HTN patients.  - Started Sildenafil 3 times daily, explained mechanism of action and educated on importance of not suddenly stopping due to rapid return of symptoms.  - Started either macitentan or ambrisentan  (pending insurance coverage).  - Ordered repeat lab work in 4 months after starting medications.  - Continued water pill.  - Ms. Sharp to take water pill early in the morning to avoid nighttime urination.    FAMILY HISTORY OF HEART DISEASE:  - Recommend heart US for daughter due to potential genetic component of pulmonary HTN.    FOLLOW-UP AND MONITORING:  - Follow up in 4 months for repeat lab work and to assess treatment efficacy.  - Contact the office if any questions arise after starting the new medications or if experiencing jaw pain as a side effect.  - Ordered lab work to be done today.          1. Pulmonary arterial hypertension  Assessment & Plan:  59 yo F presents to establish for new diagnosis of pre-capillary pulmonary hypertension which is severe with a depressed CI. Imaging and breathing tests reviewed, degree of hypoxia appears out of proportion to emphysematous lung disease concerning for overlapping pulmonary vascular disease contributing to PH. No history of VTE. Will plan on assessing for hypoxia for feasibility of vasodilatory therapy and avoid worsening of mismatch. Overall, I do favor PAH directed therapy for this patient given hemodynamics and out of proportion PH.  RHC at diagnosis:  - NYHA Class II  - WHO group 1  - REVEAL risk score 7, intermediate risk prior to therapy initiation  - cont diuresis with Lasix 40 mg QDay  - start PDE5i with sildenafil TID and ERA (ambrisentan vs macitentan)   - medication side effects addressed:    -- ERA specific side effects further review on follow up  - re-evaluate in 3-6 months with NTproBNP, CMP  - if persistent high risk 6 months following initiation of therapy, plan for referral to PH/transplant center  - f/u overnight oximetry    Orders:  -     Discontinue: sildenafil (REVATIO) 20 mg Tab; Take 1 tablet (20 mg total) by mouth 3 (three) times daily.  Dispense: 90 tablet; Refill: 11  -     MyoMarker Panel 3; Future; Expected date: 06/16/2025  -      Rheumatoid Quantitative; Future; Expected date: 06/16/2025  -     JASON EIA w/ Reflex to dsDNA/CHARLI; Future; Expected date: 06/16/2025  -     Ab to Extractable Nuclear Ag Eval,S; Future; Expected date: 06/16/2025  -     Discontinue: macitentan 10 mg Tab; Take 1 tablet (10 mg total) by mouth once daily.  Dispense: 30 tablet; Refill: 11  -     macitentan 10 mg Tab; Take 1 tablet (10 mg total) by mouth once daily.  Dispense: 30 tablet; Refill: 11  -     sildenafil (REVATIO) 20 mg Tab; Take 1 tablet (20 mg total) by mouth 3 (three) times daily.  Dispense: 90 tablet; Refill: 11       This note was generated with the assistance of ambient listening technology. Verbal consent was obtained by the patient and accompanying visitor(s) for the recording of patient appointment to facilitate this note. I attest to having reviewed and edited the generated note for accuracy, though some syntax or spelling errors may persist. Please contact the author of this note for any clarification.       Follow up in about 4 months (around 10/16/2025).    Case was discussed with patient; all questions were answered to patient's satisfaction and patient verbalized understanding.     Yanely Wheat MD  Pulmonary Medicine  [facility]  Phone: [phone]        [1]   Social History  Socioeconomic History    Marital status:    Tobacco Use    Smoking status: Every Day     Current packs/day: 0.50     Average packs/day: 0.5 packs/day for 46.5 years (23.2 ttl pk-yrs)     Types: Cigarettes     Start date: 1979    Smokeless tobacco: Never   Substance and Sexual Activity    Alcohol use: Yes     Alcohol/week: 1.0 standard drink of alcohol     Types: 1 Glasses of wine per week     Comment: Occ    Drug use: Yes     Types: Marijuana    Sexual activity: Not Currently

## 2025-06-16 NOTE — TELEPHONE ENCOUNTER
Spoke with patient, let her know I did refax overnight oximetry paperwork to medical store for her, she is aware at this time

## 2025-06-16 NOTE — TELEPHONE ENCOUNTER
----- Message from Yanely Wheat MD sent at 6/16/2025  3:50 PM CDT -----  Could we please follow up on her overnight oximetry. She hasn't heard back from anyone re testing.

## 2025-06-16 NOTE — ASSESSMENT & PLAN NOTE
61 yo F presents to establish for new diagnosis of pre-capillary pulmonary hypertension which is severe with a depressed CI. Imaging and breathing tests reviewed, degree of hypoxia appears out of proportion to emphysematous lung disease concerning for overlapping pulmonary vascular disease contributing to PH. No history of VTE. Will plan on assessing for hypoxia for feasibility of vasodilatory therapy and avoid worsening of mismatch. Overall, I do favor PAH directed therapy for this patient given hemodynamics and out of proportion PH.  RHC at diagnosis:  - NYHA Class II  - WHO group 1  - REVEAL risk score 7, intermediate risk prior to therapy initiation  - cont diuresis with Lasix 40 mg QDay  - start PDE5i with sildenafil TID and ERA (ambrisentan vs macitentan)   - medication side effects addressed:    -- ERA specific side effects further review on follow up  - re-evaluate in 3-6 months with NTproBNP, CMP  - if persistent high risk 6 months following initiation of therapy, plan for referral to PH/transplant center  - f/u overnight oximetry

## 2025-06-17 ENCOUNTER — TELEPHONE (OUTPATIENT)
Dept: PULMONOLOGY | Facility: CLINIC | Age: 60
End: 2025-06-17
Payer: MEDICAID

## 2025-06-17 NOTE — TELEPHONE ENCOUNTER
In basket sent in secure chat for Dr. Wheat, Dr. Wheat has filled out current paperwork and I did make patient aware and she voiced that she will come  tomorrow at clinic.

## 2025-06-17 NOTE — TELEPHONE ENCOUNTER
Copied from CRM #[id number]. Topic: General Inquiry - Patient Advice  >> Jun 17, 2025 11:41 AM Evangelina wrote:  Who Called: Michelle Sharp    Caller is requesting assistance/information from provider's office.    Pt is needing to speak to nurse, pt states she came in yesterday and was talking to Dr. Wheat about a handicap sticker. Pt states she didn't get the form to fill out and wanted to follow up on this.     Preferred Method of Contact: Phone Call  Patient's Preferred Phone Number on File: [phone]   Best Call Back Number, if different:  Additional Information:

## 2025-06-19 ENCOUNTER — TELEPHONE (OUTPATIENT)
Dept: PULMONOLOGY | Facility: CLINIC | Age: 60
End: 2025-06-19
Payer: MEDICAID

## 2025-06-19 NOTE — TELEPHONE ENCOUNTER
Patient came to clinic to get disability form per Dr. Wheat, gave it to patient. No further questions at this time

## 2025-06-24 ENCOUNTER — TELEPHONE (OUTPATIENT)
Dept: PULMONOLOGY | Facility: CLINIC | Age: 60
End: 2025-06-24
Payer: MEDICAID

## 2025-06-24 NOTE — TELEPHONE ENCOUNTER
Copied from CRM #[id number]. Topic: General Inquiry - Patient Advice  >> Jun 24, 2025  9:31 AM Carolina wrote:  Who Called: Michelle hSarp    Caller is requesting assistance/information from provider's office.    Patient says that the medication that was prescribed through the specialty pharmacy has not been received. She says she recently had an episode to where she could not catch her breath. She wants to know if there are any updates.       Preferred Method of Contact: Phone Call  Patient's Preferred Phone Number on File: [phone]   Best Call Back Number, if different:  Additional Information:

## 2025-06-24 NOTE — TELEPHONE ENCOUNTER
Spoke with patient regarding specialty med sildenafil (REVATIO) she voiced that [facility] reached out to her to let her know medication was ready, [facility] verified they did not receive med per patient , I will follow up with pharm on my end and see order status.

## 2025-06-25 ENCOUNTER — TELEPHONE (OUTPATIENT)
Dept: PULMONOLOGY | Facility: CLINIC | Age: 60
End: 2025-06-25
Payer: MEDICAID

## 2025-06-25 NOTE — TELEPHONE ENCOUNTER
""Wanted to provide an update. Received the Letairis and Sildenafil Rx which are on the patient[place] drug list [place]. However, she has used up all her allowed prescription fills for June. [facility] cannot process [place]; however, our [facility] location will be contacted 7/1 to re-process and I will be able to assess if PA's are needed." Copying into patients chart, also fwd to provider so she is up to date.    "

## 2025-06-25 NOTE — TELEPHONE ENCOUNTER
----- Message from Jose Aviles sent at 6/25/2025  2:55 PM CDT -----  Regarding: RE: PAH Medications  Good afternoon!     Wanted to provide an update. Received the Letairis and Sildenafil Rx which are on the patient's [place] drug list [place]. However, she has used up all her allowed prescription fills for June. [facility] cannot process [place]; however, our [facility] location will be contacted 7/1 to re-process and I will be able to assess if PA's are needed.     Thanks!   Traci French, Guillermo  Clinical Pharmacist  [facility]  (P) [phone]  (F) [phone]  ----- Message -----  From: Joan Morejon MA  Sent: 6/23/2025   8:03 AM CDT  To: Traci French PharmD  Subject: RE: PAH Medications                              Good morning,  Dr Wheat will be looking into this. Thank you!  ----- Message -----  From: Traci French PharmD  Sent: 6/20/2025   4:31 PM CDT  To: Yanely Wheat MD; Mary Alice Ny Staff  Subject: PAH Medications                                  Hi Dr. Wheat,     Patient has [place] insurance. The preferred ERA per patient's formulary is ambrisentan versus macitentan. If appropriate, can you prescribe the preferred agent ambrisentan?    Thanks,  Traci French PharmD  Clinical Pharmacist  [facility]  (P) [phone]  (F) [phone]

## 2025-06-25 NOTE — TELEPHONE ENCOUNTER
"Copying and pasting below note from clinical pharmacist at [facility], also will fwd to Dr. Wheat about alternative medication, I do plan to follow up with patient with update from [facility]    "FYI - [facility] cannot bill [place] - it has to go through an alternative pharmacy to be billed then they notify us if it requires PA. For the sildenafil, seems like it does not require a PA but pt filled the max amt of medications her plan allows for this month so unable to get a paid claim from [facility] until 7/1. For the macitentan, it is not on preferred drug list; I sent staff message to Dr. Wheat to notify ambrisentatan is preferred and to see if she would prescribe alternative Rx. Awaiting response."  From Traci French, Clinical Pharmacist at [facility]  "

## 2025-07-01 ENCOUNTER — TELEPHONE (OUTPATIENT)
Dept: PULMONOLOGY | Facility: CLINIC | Age: 60
End: 2025-07-01
Payer: MEDICAID

## 2025-07-01 NOTE — TELEPHONE ENCOUNTER
----- Message from Pharmacist Traci sent at 7/1/2025 12:00 PM CDT -----  Regarding: RE: PAH Medications  Good afternoon!     [facility] confirmed neither medication needs a PA so we are good to go from that stand point; however, the claims are coming back that Dr. Wheat [place] prescribing [place] is [place]. The Rx's cannot be filled under Dr. Wheat unless [place] prescribing status is activated. Otherwise, the Rx's will need to be written by an alternative provider with [place] active prescribing.     Let me know if any questions!    Thanks,  Traci French, Guillermo  Clinical Pharmacist  [facility]  (P) [phone]  (F) [phone]  ----- Message -----  From: Renay Biswas LPN  Sent: 6/25/2025   4:21 PM CDT  To: Traci French PharmD  Subject: RE: PAH Medications                              Good afternoon. I will add this in the patients chart, thank you for the update!  ----- Message -----  From: Traci French PharmD  Sent: 6/25/2025   2:57 PM CDT  To: Renay Biswas LPN; Joan Morejon MA; June #  Subject: RE: PAH Medications                              Good afternoon!     Wanted to provide an update. Received the Letairis and Sildenafil Rx which are on the patient's [place] drug [place]. However, she has used up all her allowed prescription fills for June. [facility] cannot [place]; however, our [facility] location will be contacted 7/1 to re-process and I will be able to assess if PA's are needed.     Thanks!   Traci French PharmD  Clinical Pharmacist  [facility]  (P) [phone]  (F) [phone]  ----- Message -----  From: Joan Morejon MA  Sent: 6/23/2025   8:03 AM CDT  To: Traci French PharmD  Subject: RE: PAH Medications                              Good morning,  Dr Wheat will be looking into this. Thank you!  ----- Message -----  From: Traci French, PharmD  Sent: 6/20/2025   4:31 PM CDT  To: Yanely  MD Mary Alice; Mary Alice Yanely Staff  Subject: PAH Medications                                  Hi Dr. Wheat,     Patient has [place] insurance. The preferred ERA per patient's formulary is ambrisentan versus macitentan. If appropriate, can you prescribe the preferred agent ambrisentan?    Thanks,  Traci French, PharmD  Clinical Pharmacist  [facility]  (P) [phone]  (F) [phone]

## 2025-07-01 NOTE — TELEPHONE ENCOUNTER
""[facility] confirmed neither medication needs a PA so we are good to go from that stand point; however, the claims are coming back that Dr. Wheat [place] is [place]. The Rx's cannot be filled under Dr. Wheat unless [place] prescribing status is [place]. Otherwise, the Rx's will need to be written by an alternative provider with [place] active prescribing." [facility] updated and copied to chart, did respond back to Rudy GonzalezTraci as to see if her PCP can be an alternate provider to provide Rx's.     "

## 2025-07-02 ENCOUNTER — TELEPHONE (OUTPATIENT)
Dept: PULMONOLOGY | Facility: CLINIC | Age: 60
End: 2025-07-02
Payer: MEDICAID

## 2025-07-02 ENCOUNTER — RESULTS FOLLOW-UP (OUTPATIENT)
Dept: PULMONOLOGY | Facility: CLINIC | Age: 60
End: 2025-07-02

## 2025-07-02 DIAGNOSIS — I27.21 PULMONARY ARTERIAL HYPERTENSION: Primary | ICD-10-CM

## 2025-07-02 NOTE — TELEPHONE ENCOUNTER
Spoke with the patient verbalized results. Patient understood. Patient voiced that she did not want to switch providers. She loves  and that she explains everything to her. Appt was cancelled with  and made with  for 10/2025. Patient agree. No questions or concerns at this time.

## 2025-07-02 NOTE — PROGRESS NOTES
Overnight oximetry obtained.  Test performed 06/18/2025 overnight on room air.    SpO2 taylor 52% with time consecutive <88% 7 minutes   KELLEN 19 with ODE (3%) 131    We will start oxygen therapy with 3 L nasal cannula q.h.s..  We will plan on repeating testing with overnight oximetry on oxygen therapy within 1 year.

## 2025-07-02 NOTE — TELEPHONE ENCOUNTER
Overnight oximetry/ would benefit from nocturnal oxygen therapy. Please let her know that we are sending in the prescription for oxygen to be used at night. Thank you. Per

## 2025-07-02 NOTE — TELEPHONE ENCOUNTER
----- Message from Yanely Wheat MD sent at 7/2/2025  7:19 AM CDT -----  Hey team, let this patient know that she has testing that is consistent with an autoimmune cause for her pulmonary hypertension. She was supposed to follow up with me, I don't see one scheduled.   Please f/u with her to see if she wanted to discontinue follow up with me. Thanks.   ----- Message -----  From: Lab, Background User  Sent: 6/16/2025   5:29 PM CDT  To: Yanely Wheat MD

## 2025-07-08 DIAGNOSIS — I27.21 PULMONARY ARTERIAL HYPERTENSION: ICD-10-CM

## 2025-07-08 RX ORDER — AMBRISENTAN 5 MG/1
5 TABLET, FILM COATED ORAL DAILY
Qty: 30 TABLET | Refills: 11 | Status: SHIPPED | OUTPATIENT
Start: 2025-07-08 | End: 2026-07-03

## 2025-07-08 RX ORDER — SILDENAFIL CITRATE 20 MG/1
20 TABLET ORAL 3 TIMES DAILY
Qty: 90 TABLET | Refills: 11 | Status: SHIPPED | OUTPATIENT
Start: 2025-07-08 | End: 2026-07-08

## 2025-07-08 NOTE — TELEPHONE ENCOUNTER
----- Message from Pharmacist Traci sent at 7/8/2025  1:31 PM CDT -----  Regarding: FW: PAH Medications  Good afternoon!     Following up on my previous message regarding patient's access to PAH medications:    [facility] confirmed neither medication needs a PA so we are good to go from that stand point; however, the claims are coming back that Dr. Wheat [place]. The Rx's cannot be filled under Dr. Wheat unless [place] prescribing [place] is [place]. Otherwise, the Rx's will need to be written by an alternative provider with [place] active prescribing.     Please let me know how you would like to proceed! The patient is calling in and asking for an update.     Thanks,  Traci French, Guillermo  Clinical Pharmacist  [facility]  (P) [phone]  (F) [phone]  ----- Message -----  From: Traci French PharmD  Sent: 7/1/2025  12:25 PM CDT  To: Renay Biswas LPN  Subject: RE: PAH Medications                              I think it will have to come through a pulmonologist or cardiologist based on the indication for meds.  ----- Message -----  From: Renay Biswas LPN  Sent: 7/1/2025  12:15 PM CDT  To: Traci French PharmD  Subject: RE: PAH Medications                              Thank you so much for the update! Would an alternate provider be someone like her PCP that could handle the Rx's?  ----- Message -----  From: Tarci French PharmD  Sent: 7/1/2025  12:02 PM CDT  To: Renay Biswas LPN; Yanely MD Mary Alice; Mary Alice Yanely#  Subject: RE: PAH Medications                              Good afternoon!     [facility] confirmed neither medication needs a PA so we are good to go from that stand point; however, the claims are coming back that Dr. Wheat [place]. The Rx's cannot be filled under Dr. Wheat unless [place] prescribing [place] is [place]. Otherwise, the Rx's will need to be written by an alternative  provider with [place] active prescribing.     Let me know if any questions!    Thanks,  Traci French PharmD  Clinical Pharmacist  [facility]  (P) [phone]  (F) [phone]  ----- Message -----  From: Renay iBswas LPN  Sent: 6/25/2025   4:21 PM CDT  To: Traci French PharmD  Subject: RE: PAH Medications                              Good afternoon. I will add this in the patients chart, thank you for the update!  ----- Message -----  From: Traci French PharmD  Sent: 6/25/2025   2:57 PM CDT  To: Renay Biswas LPN; Joan Morejon MA; Yanely #  Subject: RE: PAH Medications                              Good afternoon!     Wanted to provide an update. Received the Letairis and Sildenafil Rx which are on the patient[place] drug [place]. However, she has used up all her allowed prescription fills for June. [facility] cannot process [place]; however, our [facility] location will be contacted 7/1 to re-process and I will be able to assess if PA's are needed.     Thanks!   Traci French PharmD  Clinical Pharmacist  [facility]  (P) [phone]  (F) [phone]  ----- Message -----  From: Joan Morejon MA  Sent: 6/23/2025   8:03 AM CDT  To: Traci French PharmD  Subject: RE: PAH Medications                              Good morning,  Dr Wheat will be looking into this. Thank you!  ----- Message -----  From: Traci French PharmD  Sent: 6/20/2025   4:31 PM CDT  To: Yanely Wheat MD; Mary Alice Ny Staff  Subject: PAH Medications                                  Hi Dr. Wheat,     Patient has [place] insurance. The preferred ERA per patient's formulary is ambrisentan versus macitentan. If appropriate, can you prescribe the preferred agent ambrisentan?    Thanks,  Traci French PharmD  Clinical Pharmacist  [facility]  (P) [phone] (F) [phone]

## 2025-07-08 NOTE — TELEPHONE ENCOUNTER
Traci French, PharmD  Renay Biswas LPN; SALOME [facility] Staff; Yanely Wheat MD  Good afternoon!    Following up on my previous message regarding patient's access to PAH medications:    [facility] confirmed neither medication needs a PA so we are good to go from that stand point; however, the claims are coming back that Dr. Wheat [place] Medicaid prescribing status is inactive. The Rx's cannot be filled under Dr. Wheat unless [place] Medicaid prescribing status is activated. Otherwise, the Rx's will need to be written by an alternative provider with [place] Medicaid active prescribing.    Please let me know how you would like to proceed! The patient is calling in and asking for an update.    Thanks,  Traci French, PharmD  Clinical Pharmacist  [facility]  (P) [phone]  (F) [phone]

## 2025-07-21 ENCOUNTER — TELEPHONE (OUTPATIENT)
Dept: PULMONOLOGY | Facility: CLINIC | Age: 60
End: 2025-07-21
Payer: MEDICAID

## 2025-07-21 ENCOUNTER — OFFICE VISIT (OUTPATIENT)
Dept: PULMONOLOGY | Facility: CLINIC | Age: 60
End: 2025-07-21
Payer: MEDICAID

## 2025-07-21 VITALS
SYSTOLIC BLOOD PRESSURE: 110 MMHG | DIASTOLIC BLOOD PRESSURE: 60 MMHG | WEIGHT: 137.19 LBS | RESPIRATION RATE: 18 BRPM | HEART RATE: 84 BPM | OXYGEN SATURATION: 93 % | BODY MASS INDEX: 23.55 KG/M2

## 2025-07-21 DIAGNOSIS — I27.21 PULMONARY ARTERIAL HYPERTENSION: Primary | ICD-10-CM

## 2025-07-21 PROCEDURE — 99215 OFFICE O/P EST HI 40 MIN: CPT | Mod: PBBFAC | Performed by: STUDENT IN AN ORGANIZED HEALTH CARE EDUCATION/TRAINING PROGRAM

## 2025-07-21 PROCEDURE — 3078F DIAST BP <80 MM HG: CPT | Mod: CPTII,,, | Performed by: STUDENT IN AN ORGANIZED HEALTH CARE EDUCATION/TRAINING PROGRAM

## 2025-07-21 PROCEDURE — 99999 PR PBB SHADOW E&M-EST. PATIENT-LVL V: CPT | Mod: PBBFAC,,, | Performed by: STUDENT IN AN ORGANIZED HEALTH CARE EDUCATION/TRAINING PROGRAM

## 2025-07-21 PROCEDURE — 99213 OFFICE O/P EST LOW 20 MIN: CPT | Mod: S$PBB,,, | Performed by: STUDENT IN AN ORGANIZED HEALTH CARE EDUCATION/TRAINING PROGRAM

## 2025-07-21 PROCEDURE — 1160F RVW MEDS BY RX/DR IN RCRD: CPT | Mod: CPTII,,, | Performed by: STUDENT IN AN ORGANIZED HEALTH CARE EDUCATION/TRAINING PROGRAM

## 2025-07-21 PROCEDURE — 3008F BODY MASS INDEX DOCD: CPT | Mod: CPTII,,, | Performed by: STUDENT IN AN ORGANIZED HEALTH CARE EDUCATION/TRAINING PROGRAM

## 2025-07-21 PROCEDURE — 1159F MED LIST DOCD IN RCRD: CPT | Mod: CPTII,,, | Performed by: STUDENT IN AN ORGANIZED HEALTH CARE EDUCATION/TRAINING PROGRAM

## 2025-07-21 PROCEDURE — 3074F SYST BP LT 130 MM HG: CPT | Mod: CPTII,,, | Performed by: STUDENT IN AN ORGANIZED HEALTH CARE EDUCATION/TRAINING PROGRAM

## 2025-07-21 NOTE — TELEPHONE ENCOUNTER
Spoke with medical store regarding update on oxygen order that was sent on 7/2. Medical store confirmed that they did receive order and have tried reaching out to patient multiple times for delivery but cannot get patient on the phone, did both confirm that her number Is [phone] as listed in [facility]. Medical store also stated that  approached their house on 7/7/25 to try to deliver 02 and could not get anyone at the door, I confirmed the address to be as listed in [facility] ( [address]) and they confirmed as well. Did give spouses number that was listed in chart and medical store stated they will attempt to follow up with him. Aware and acknowledged.

## 2025-07-21 NOTE — PROGRESS NOTES
[facility]  Pulmonology  ESTABLISHED VISIT     Patient Name:  Michelle Sharp  Primary Care Provider: Génesis Villanueva NP  Date of Service: 7/21/2025       Chief Complaint: shortness of breath    SUBJECTIVE   HPI:  Michelle Sharp is a 60 y.o. female with centrilobular emphysema and pre-capillary pulmonary hypertension  presents for follow up of shortness of breath with complaints of medication side effect.     Ms. Sharp presents to discuss side effects experienced with recently prescribed medications for pulmonary hypertension. She began taking sildenafil and ambrisentan (Letairis). On the fourth day of treatment, she developed periorbital edema, dizziness, and headaches. She contacted the nurse, who advised her to discontinue ambrisentan but continue with the other medications. Since discontinuing ambrisentan, she reports no further headaches or dizziness.  She notes ongoing dyspnea. She describes a recent incident where she had severe shortness of breath after running, requiring her to use her air conditioner for relief before using her naproxen inhaler, which alleviated her symptoms. She also mentions dizziness when rising quickly from a bent position.  She reports recent chills, uncertain if they are medication-related or due to illness. She also notes fluid retention, particularly around her ankles. She describes heaviness in her legs when climbing stairs, which worsens by the final step. Completed overnight oximetry 06/2025 and was started on nocturnal oxygen 3L NC; this remains pending delivery to home. She has noticed weight loss on the scale.    Initial HPI  Ms. Sharp presents for evaluation and management of pulmonary hypertension, as diagnosed by a recent heart catheterization. She reports chest pain, which prompted her to see her primary care physician, who then referred her to Dr. Christensen who performed the RHC. She describes episodes of chest pain occurring during activities such as  sweeping, mowing, and fishing. She recalls a specific incident while fishing where she had chest pain after catching two large catfish and casting a third line, which alarmed her as she was alone and had not informed anyone of her location. She reports shortness of breath, becoming dyspneic when walking from her neighbor's house back to her own. She also has dizziness, recalling an incident where she felt lightheaded after bending down to look under her trailer. She mentions feeling dizzy when lying down at night, describing it as a sensation of blood rushing to her head, which persists regardless of her position. She has noticed a significant decline in her exercise tolerance. In January, she had severe dyspnea after walking across a parking lot at [facility], where her daughter had emergency appendix surgery. This event made her realize the severity of her condition.   She reports smoking one pack of cigarettes per day, reduced from two packs previously. She often lights a cigarette, takes only a few puffs, and then extinguishes it.    SOCIAL HISTORY:  Smoking: Currently smokes 1 pack per day, down from 2 packs per day. Attempting to quit.     06/2025: presents for follow-up of pulmonary hypertension and to discuss new medication regimen. She notes persistent leg heaviness when ascending stairs, though less severe than before starting medication. During a recent trip to [place], she had significant leg heaviness after climbing 12 steps. She has been taking a diuretic and reports frequent urination, about 6-7 times over several hours after taking the medication. Ms. Sharp describes dizziness when lying down at night, feeling a sensation of blood rushing to her head. This occurs in both supine and lateral positions, sometimes requiring her to sit up momentarily before reclining. She finds relief by placing her face down on the pillow.  She has a history of menopausal hot flashes, which began at age  41.      Past Medical History:   Diagnosis Date    DDD (degenerative disc disease), lumbar     Emphysema, unspecified     Hypertension     Lung nodule seen on imaging study     Mixed hyperlipidemia     Pulmonary arterial hypertension 04/17/2025       Past Surgical History:   Procedure Laterality Date    breast tumor removal Right     fibrocystic breast    CHOLECYSTECTOMY      COLOSTOMY      placed and removed    HERNIA REPAIR      RIGHT HEART CATHETERIZATION  04/10/2025    TONSILLECTOMY      TUBAL LIGATION         No family history on file.     Social History[1]    Social History     Social History Narrative    Not on file       Review of patient's allergies indicates:  No Known Allergies     Medications: Medications reviewed to include over the counter medications.    Review of Systems: A focused ROS was completed and found to be negative except for that mentioned above.      OBJECTIVE   PHYSICAL EXAM:  Vitals:    07/21/25 1441   BP: 110/60   BP Location: Left arm   Patient Position: Sitting   Pulse: 84   Resp: 18   SpO2: (!) 93%   Weight: 62.2 kg (137 lb 3.2 oz)     GENERAL: NAD  HEENT: normocephalic, non-icteric conjunctivae, moist oral mucosa  RESPIRATORY: clear to auscultation, no wheezing, rales or rhonchi  CARDIOVASCULAR: regular rate and rhythm, grade III/VI systolic murmur present   MUSCULOSKELETAL: No clubbing or cyanosis; mild non pititing pedal edema  NEUROLOGIC: AO ×3, no gross deficits    LABS:  Lab studies reviewed and notable for H/H 15.6/49.1, SEos 30, CO2 21, SCr 1.03, T bili 0.5, AST/ALT 27/18 (05/2025), RA titer wnl 30, JASON screen positive, CHARLI Ag negative, Anti-dsDNA positive, Anti-U1 RNP Ab positive 26 (06/2025)   Latest Reference Range & Units 05/12/25 16:36   NT-proBNP 1 - 125 pg/mL 5,242 (H)     IMAGING:  Imaging reviewed and notable for CT chest 11/2024 with upper lobe predominant acinar and paraseptal emphysema, right middle lobe nodule versus intrapulmonary lymph node, dilated pulmonary  artery, no pleural effusion, small pericardial effusion present    OS RHC 04/2025: RAP 22, PA 94/44/59, PCWP 13, CO/CI 3.29/1.91, PVR 14 (Td), LVEDP 15    LUNG FUNCTION TESTING:     SPIROMETRY/PFT:  11/2024 Pre Post   FVC 3.59/114% 3.50/111%   FEV1 2.41/97% 2.43/98%   FEV1/FVC 67 69   TLC 5.94/117%    FRC  4.01/140%    RV 2.35/117%    DLCO 6.70/33%    My interpretation: mild obstruction by GOLD standards, hyperinflation and gas trapping present, mod-severe diffusion deficit    6MWD:  Date Distance (ft) Resting SpO2; Taylor SpO2 O2 Required   06/2025 1274 95%, RA, 92% None           Overnight Oximetry 06/18/2025:  Performed overnight on room air.    Time <88% 27 minutes 36 seconds   Low SpO2 52%   Basal SpO2 89.8%   SpO2 taylor 52% with time consecutive <88% 7 minutes   KELLEN 19 with ODE (3%) 131    ASSESSMENT & PLAN      Assessment & Plan    I27.0 Primary pulmonary hypertension  E78.5 Hyperlipidemia, unspecified  R60.1 Generalized edema  H02.849 Edema of unspecified eye, unspecified eyelid  R06.00 Dyspnea, unspecified  Z99.81 Dependence on supplemental oxygen  R09.89 Other specified symptoms and signs involving the circulatory and respiratory systems  T46.7X5A Adverse effect of peripheral vasodilators, initial encounter  R42 Dizziness and giddiness  R51.9 Headache, unspecified  R68.83 Chills (without fever)    IMPRESSION:  Assessed reported side effects from recently started medications (ambrisentan and sildenafil) for pulmonary HTN.  Determined facial swelling could be due to excess fluid retention vs direct medication side effect. She has weight loss with diuretic therapy; will trial Rx once again and if recurrent symptoms will plan on switching ERA therapy.  Reviewed fluid retention levels, noting elevated BNP of over 5000.  Evaluated need for nighttime oxygen therapy based on previous sleep study results.    PRIMARY PULMONARY HYPERTENSION:  - Discussed how pulmonary HTN affects oxygen distribution, prioritizing the  brain and potentially causing leg heaviness during exertion.  - Ms. Sharp to avoid running or sudden exertion.  - Ms. Sharp to use deep breathing techniques when feeling short of breath.  - Will follow up on home oxygen for nighttime use.  - Advised against abrupt discontinuation of sildenafil due to risk of sudden pulmonary pressure increase.  - Continued sildenafil 3 times daily.  - Restarted ambrisentan (to begin tomorrow) to further evaluate tolerance and potential side effects.  - Continued albuterol and Combivent as needed.    EDEMA:  - Explained periorbital edema can result from excess fluid retention in the body.  - Continued water pill (diuretic).    FOLLOW-UP:  - Contact the office in a few days to check on symptoms after restarting ambrisentan.  - Contact the office if experiencing swelling in eyes or legs after restarting ambrisentan.          1. Pulmonary arterial hypertension  Assessment & Plan:  61 yo F presents to establish for new diagnosis of pre-capillary pulmonary hypertension which is severe with a depressed CI. Imaging and breathing tests reviewed, degree of hypoxia appears out of proportion to emphysematous lung disease concerning for overlapping pulmonary vascular disease contributing to PH. No history of VTE. Will plan on assessing for hypoxia for feasibility of vasodilatory therapy and avoid worsening of mismatch. Overall, I do favor PAH directed therapy for this patient given hemodynamics and out of proportion PH.  RHC at diagnosis:  - NYHA Class II  - WHO group 1  - REVEAL risk score 7, intermediate risk prior to therapy initiation  - cont diuresis with Lasix 40 mg QDay  - cont sildenafil TID  - resume ambrisentan Qday  -- in the event that her side effects are intolerable, will transition to macitentan   - medication side effects addressed  - on follow up, NTproBNP and CMP  - if persistent high risk 6 months following initiation of therapy, plan for referral to PH/transplant  center  - f/u overnight oximetry           This note was generated with the assistance of ambient listening technology. Verbal consent was obtained by the patient and accompanying visitor(s) for the recording of patient appointment to facilitate this note. I attest to having reviewed and edited the generated note for accuracy, though some syntax or spelling errors may persist. Please contact the author of this note for any clarification.       Follow up in about 3 months (around 10/21/2025) for Routine follow up; already scheduled.    Case was discussed with patient; all questions were answered to patient's satisfaction and patient verbalized understanding.     Yanely Wheat MD  Pulmonary Medicine  [facility]  Phone: [phone]          [1]   Social History  Socioeconomic History    Marital status:    Tobacco Use    Smoking status: Every Day     Current packs/day: 0.50     Average packs/day: 0.5 packs/day for 46.6 years (23.3 ttl pk-yrs)     Types: Cigarettes     Start date: 1979    Smokeless tobacco: Never   Substance and Sexual Activity    Alcohol use: Yes     Alcohol/week: 1.0 standard drink of alcohol     Types: 1 Glasses of wine per week     Comment: Occ    Drug use: Yes     Types: Marijuana    Sexual activity: Not Currently

## 2025-07-21 NOTE — ASSESSMENT & PLAN NOTE
59 yo F presents to establish for new diagnosis of pre-capillary pulmonary hypertension which is severe with a depressed CI. Imaging and breathing tests reviewed, degree of hypoxia appears out of proportion to emphysematous lung disease concerning for overlapping pulmonary vascular disease contributing to PH. No history of VTE. Will plan on assessing for hypoxia for feasibility of vasodilatory therapy and avoid worsening of mismatch. Overall, I do favor PAH directed therapy for this patient given hemodynamics and out of proportion PH.  RHC at diagnosis:  - NYHA Class II  - WHO group 1  - REVEAL risk score 7, intermediate risk prior to therapy initiation  - cont diuresis with Lasix 40 mg QDay  - cont sildenafil TID  - resume ambrisentan Qday  -- in the event that her side effects are intolerable, will transition to macitentan   - medication side effects addressed  - on follow up, NTproBNP and CMP  - if persistent high risk 6 months following initiation of therapy, plan for referral to PH/transplant center  - f/u overnight oximetry

## 2025-07-25 ENCOUNTER — TELEPHONE (OUTPATIENT)
Dept: PULMONOLOGY | Facility: CLINIC | Age: 60
End: 2025-07-25
Payer: MEDICAID

## 2025-07-30 ENCOUNTER — TELEPHONE (OUTPATIENT)
Dept: PULMONOLOGY | Facility: CLINIC | Age: 60
End: 2025-07-30
Payer: MEDICAID

## 2025-07-30 NOTE — TELEPHONE ENCOUNTER
Spoke with Nikole, was inquiring about following up with omar regarding if omar had followed up with pt about switch from the letairis to OPSUMIT. Did voice to her I see where omar attempted to call but could not get answer from pt. Nikole stated to have omar called her next week to follow up. I will attempt to call patient myself also.

## 2025-07-30 NOTE — TELEPHONE ENCOUNTER
Copied from CRM #[id number]. Topic: General Inquiry - Patient Advice  >> Jul 30, 2025  9:44 AM Anabela wrote:  Who Called: Nikole Mccoy     She is calling regarding the patient's medication    Patient's Preferred Phone Number on File: [phone]

## 2025-08-04 ENCOUNTER — TELEPHONE (OUTPATIENT)
Dept: PULMONOLOGY | Facility: CLINIC | Age: 60
End: 2025-08-04
Payer: MEDICAID

## 2025-08-04 NOTE — TELEPHONE ENCOUNTER
"Patient came into clinic stating that she had some new onset symptoms since starting medication ambrisentan. Patent state she had some eye "puffiness", headaches and a low grade fever.   Informed  of symptoms she informed for patient to stop taking the medication.   Called patient and informed her and she voiced understanding.         "

## 2025-08-13 ENCOUNTER — TELEPHONE (OUTPATIENT)
Dept: PULMONOLOGY | Facility: CLINIC | Age: 60
End: 2025-08-13
Payer: MEDICAID

## 2025-08-22 ENCOUNTER — HOSPITAL ENCOUNTER (OUTPATIENT)
Dept: RADIOLOGY | Facility: HOSPITAL | Age: 60
Discharge: HOME | End: 2025-08-22
Attending: INTERNAL MEDICINE
Payer: MEDICAID

## 2025-08-22 DIAGNOSIS — R22.2 LOCALIZED SWELLING, MASS AND LUMP, TRUNK: ICD-10-CM

## 2025-08-22 PROCEDURE — 71250 CT THORAX DX C-: CPT | Mod: TC

## 2025-08-22 PROCEDURE — 71250 CT THORAX DX C-: CPT | Mod: 26,,, | Performed by: INTERNAL MEDICINE
